# Patient Record
Sex: FEMALE | Race: BLACK OR AFRICAN AMERICAN | NOT HISPANIC OR LATINO | Employment: FULL TIME | URBAN - METROPOLITAN AREA
[De-identification: names, ages, dates, MRNs, and addresses within clinical notes are randomized per-mention and may not be internally consistent; named-entity substitution may affect disease eponyms.]

---

## 2022-04-29 PROBLEM — F17.210 CIGARETTE NICOTINE DEPENDENCE WITHOUT COMPLICATION: Status: ACTIVE | Noted: 2022-04-29

## 2022-04-29 PROBLEM — E11.9 TYPE 2 DIABETES MELLITUS WITHOUT COMPLICATION, WITH LONG-TERM CURRENT USE OF INSULIN: Status: ACTIVE | Noted: 2022-04-29

## 2022-04-29 PROBLEM — Z79.4 TYPE 2 DIABETES MELLITUS WITHOUT COMPLICATION, WITH LONG-TERM CURRENT USE OF INSULIN: Status: ACTIVE | Noted: 2022-04-29

## 2022-04-29 PROBLEM — E87.1 HYPONATREMIA: Status: ACTIVE | Noted: 2022-04-29

## 2022-04-29 PROBLEM — S61.208A: Status: ACTIVE | Noted: 2022-04-29

## 2022-04-29 PROBLEM — M86.9 OSTEOMYELITIS: Status: ACTIVE | Noted: 2022-04-29

## 2022-04-30 PROBLEM — Z86.19 HISTORY OF HELICOBACTER PYLORI INFECTION: Status: ACTIVE | Noted: 2022-04-30

## 2022-05-03 PROBLEM — R79.89 PSEUDOHYPONATREMIA: Status: ACTIVE | Noted: 2022-04-29

## 2022-06-14 PROBLEM — M62.08 DIASTASIS RECTI: Status: ACTIVE | Noted: 2022-06-14
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
## 2022-06-15 PROBLEM — M25.50 POLYARTHRALGIA: Status: ACTIVE | Noted: 2021-07-14

## 2022-06-15 PROBLEM — A54.86 DGI (DISSEMINATED GONOCOCCAL INFECTION): Status: ACTIVE | Noted: 2021-07-19

## 2022-06-15 PROBLEM — O24.319 MODIFIED WHITE CLASS B PREGESTATIONAL DIABETES MELLITUS: Status: ACTIVE | Noted: 2018-01-23

## 2022-06-15 PROBLEM — E11.65 HYPERGLYCEMIA DUE TO DIABETES MELLITUS: Status: ACTIVE | Noted: 2021-07-14

## 2022-06-15 PROBLEM — R70.0 ESR RAISED: Status: ACTIVE | Noted: 2021-07-14

## 2022-06-15 PROBLEM — Z98.891 HISTORY OF C-SECTION: Status: ACTIVE | Noted: 2018-01-23

## 2022-06-15 PROBLEM — R79.82 ELEVATED C-REACTIVE PROTEIN (CRP): Status: ACTIVE | Noted: 2021-07-14

## 2022-06-15 PROBLEM — Z98.51 S/P TUBAL LIGATION: Status: ACTIVE | Noted: 2018-02-26

## 2022-06-15 PROBLEM — D64.9 ANEMIA: Status: ACTIVE | Noted: 2021-07-14

## 2022-09-21 PROBLEM — R14.0 ABDOMINAL BLOATING: Status: ACTIVE | Noted: 2022-09-21

## 2022-09-21 PROBLEM — R10.33 PERIUMBILICAL PAIN: Status: ACTIVE | Noted: 2022-09-21

## 2023-03-28 ENCOUNTER — PATIENT MESSAGE (OUTPATIENT)
Dept: RESEARCH | Facility: HOSPITAL | Age: 38
End: 2023-03-28

## 2025-07-18 ENCOUNTER — PATIENT OUTREACH (OUTPATIENT)
Dept: ADMINISTRATIVE | Facility: HOSPITAL | Age: 40
End: 2025-07-18

## 2025-07-18 DIAGNOSIS — E11.9 TYPE 2 DIABETES MELLITUS WITHOUT COMPLICATION, WITH LONG-TERM CURRENT USE OF INSULIN: Primary | ICD-10-CM

## 2025-07-18 DIAGNOSIS — Z79.4 TYPE 2 DIABETES MELLITUS WITHOUT COMPLICATION, WITH LONG-TERM CURRENT USE OF INSULIN: Primary | ICD-10-CM

## 2025-07-31 ENCOUNTER — PATIENT OUTREACH (OUTPATIENT)
Dept: ADMINISTRATIVE | Facility: HOSPITAL | Age: 40
End: 2025-07-31

## 2025-07-31 NOTE — PROGRESS NOTES
7-31-25 please address open care gap mammogram and cervical cancer screening, please offer self swab during visit, noted on 8-1-25 appt notes